# Patient Record
Sex: MALE | Race: WHITE | NOT HISPANIC OR LATINO | Employment: FULL TIME | URBAN - METROPOLITAN AREA
[De-identification: names, ages, dates, MRNs, and addresses within clinical notes are randomized per-mention and may not be internally consistent; named-entity substitution may affect disease eponyms.]

---

## 2017-01-06 ENCOUNTER — RESOLUTE PROFESSIONAL BILLING HOSPITAL PROF FEE (OUTPATIENT)
Dept: HOSPITALIST | Facility: MEDICAL CENTER | Age: 42
End: 2017-01-06
Payer: COMMERCIAL

## 2017-01-06 ENCOUNTER — HOSPITAL ENCOUNTER (OUTPATIENT)
Facility: MEDICAL CENTER | Age: 42
End: 2017-01-07
Attending: EMERGENCY MEDICINE | Admitting: EMERGENCY MEDICINE
Payer: COMMERCIAL

## 2017-01-06 ENCOUNTER — APPOINTMENT (OUTPATIENT)
Dept: RADIOLOGY | Facility: MEDICAL CENTER | Age: 42
End: 2017-01-06
Attending: EMERGENCY MEDICINE
Payer: COMMERCIAL

## 2017-01-06 DIAGNOSIS — R07.9 CHEST PAIN, UNSPECIFIED TYPE: ICD-10-CM

## 2017-01-06 DIAGNOSIS — R55 SYNCOPE, UNSPECIFIED SYNCOPE TYPE: ICD-10-CM

## 2017-01-06 LAB
ALBUMIN SERPL BCP-MCNC: 4.2 G/DL (ref 3.2–4.9)
ALBUMIN/GLOB SERPL: 1.9 G/DL
ALP SERPL-CCNC: 59 U/L (ref 30–99)
ALT SERPL-CCNC: 18 U/L (ref 2–50)
ANION GAP SERPL CALC-SCNC: 10 MMOL/L (ref 0–11.9)
APTT PPP: 24 SEC (ref 24.7–36)
AST SERPL-CCNC: 19 U/L (ref 12–45)
BASOPHILS # BLD AUTO: 0.1 % (ref 0–1.8)
BASOPHILS # BLD: 0.01 K/UL (ref 0–0.12)
BILIRUB SERPL-MCNC: 0.8 MG/DL (ref 0.1–1.5)
BNP SERPL-MCNC: 9 PG/ML (ref 0–100)
BUN SERPL-MCNC: 21 MG/DL (ref 8–22)
CALCIUM SERPL-MCNC: 9.2 MG/DL (ref 8.5–10.5)
CHLORIDE SERPL-SCNC: 106 MMOL/L (ref 96–112)
CO2 SERPL-SCNC: 22 MMOL/L (ref 20–33)
CREAT SERPL-MCNC: 1.02 MG/DL (ref 0.5–1.4)
DEPRECATED D DIMER PPP IA-ACNC: <200 NG/ML(D-DU)
EOSINOPHIL # BLD AUTO: 0.01 K/UL (ref 0–0.51)
EOSINOPHIL NFR BLD: 0.1 % (ref 0–6.9)
ERYTHROCYTE [DISTWIDTH] IN BLOOD BY AUTOMATED COUNT: 40.3 FL (ref 35.9–50)
GFR SERPL CREATININE-BSD FRML MDRD: >60 ML/MIN/1.73 M 2
GLOBULIN SER CALC-MCNC: 2.2 G/DL (ref 1.9–3.5)
GLUCOSE SERPL-MCNC: 105 MG/DL (ref 65–99)
HCT VFR BLD AUTO: 44.6 % (ref 42–52)
HGB BLD-MCNC: 15 G/DL (ref 14–18)
IMM GRANULOCYTES # BLD AUTO: 0.05 K/UL (ref 0–0.11)
IMM GRANULOCYTES NFR BLD AUTO: 0.5 % (ref 0–0.9)
INR PPP: 1 (ref 0.87–1.13)
LYMPHOCYTES # BLD AUTO: 1.04 K/UL (ref 1–4.8)
LYMPHOCYTES NFR BLD: 9.4 % (ref 22–41)
MCH RBC QN AUTO: 30 PG (ref 27–33)
MCHC RBC AUTO-ENTMCNC: 33.6 G/DL (ref 33.7–35.3)
MCV RBC AUTO: 89.2 FL (ref 81.4–97.8)
MONOCYTES # BLD AUTO: 0.42 K/UL (ref 0–0.85)
MONOCYTES NFR BLD AUTO: 3.8 % (ref 0–13.4)
NEUTROPHILS # BLD AUTO: 9.5 K/UL (ref 1.82–7.42)
NEUTROPHILS NFR BLD: 86.1 % (ref 44–72)
NRBC # BLD AUTO: 0 K/UL
NRBC BLD AUTO-RTO: 0 /100 WBC
PLATELET # BLD AUTO: 266 K/UL (ref 164–446)
PMV BLD AUTO: 9.8 FL (ref 9–12.9)
POTASSIUM SERPL-SCNC: 4.1 MMOL/L (ref 3.6–5.5)
PROT SERPL-MCNC: 6.4 G/DL (ref 6–8.2)
PROTHROMBIN TIME: 13.5 SEC (ref 12–14.6)
RBC # BLD AUTO: 5 M/UL (ref 4.7–6.1)
SODIUM SERPL-SCNC: 138 MMOL/L (ref 135–145)
TROPONIN I SERPL-MCNC: <0.01 NG/ML (ref 0–0.04)
TSH SERPL DL<=0.005 MIU/L-ACNC: 4.33 UIU/ML (ref 0.3–3.7)
WBC # BLD AUTO: 11 K/UL (ref 4.8–10.8)

## 2017-01-06 PROCEDURE — 85610 PROTHROMBIN TIME: CPT

## 2017-01-06 PROCEDURE — 85379 FIBRIN DEGRADATION QUANT: CPT

## 2017-01-06 PROCEDURE — 36415 COLL VENOUS BLD VENIPUNCTURE: CPT

## 2017-01-06 PROCEDURE — G0378 HOSPITAL OBSERVATION PER HR: HCPCS

## 2017-01-06 PROCEDURE — 83880 ASSAY OF NATRIURETIC PEPTIDE: CPT

## 2017-01-06 PROCEDURE — 94760 N-INVAS EAR/PLS OXIMETRY 1: CPT

## 2017-01-06 PROCEDURE — 84443 ASSAY THYROID STIM HORMONE: CPT

## 2017-01-06 PROCEDURE — 93005 ELECTROCARDIOGRAM TRACING: CPT | Performed by: EMERGENCY MEDICINE

## 2017-01-06 PROCEDURE — A9270 NON-COVERED ITEM OR SERVICE: HCPCS | Performed by: HOSPITALIST

## 2017-01-06 PROCEDURE — 700102 HCHG RX REV CODE 250 W/ 637 OVERRIDE(OP): Performed by: HOSPITALIST

## 2017-01-06 PROCEDURE — 71010 DX-CHEST-PORTABLE (1 VIEW): CPT

## 2017-01-06 PROCEDURE — 84484 ASSAY OF TROPONIN QUANT: CPT

## 2017-01-06 PROCEDURE — 99220 PR INITIAL OBSERVATION CARE,LEVL III: CPT | Performed by: HOSPITALIST

## 2017-01-06 PROCEDURE — 85025 COMPLETE CBC W/AUTO DIFF WBC: CPT

## 2017-01-06 PROCEDURE — 80053 COMPREHEN METABOLIC PANEL: CPT

## 2017-01-06 PROCEDURE — 85730 THROMBOPLASTIN TIME PARTIAL: CPT

## 2017-01-06 PROCEDURE — 96360 HYDRATION IV INFUSION INIT: CPT

## 2017-01-06 PROCEDURE — 99285 EMERGENCY DEPT VISIT HI MDM: CPT

## 2017-01-06 PROCEDURE — 700105 HCHG RX REV CODE 258: Performed by: EMERGENCY MEDICINE

## 2017-01-06 RX ORDER — NITROGLYCERIN 0.4 MG/1
0.4 TABLET SUBLINGUAL
Status: DISCONTINUED | OUTPATIENT
Start: 2017-01-06 | End: 2017-01-07 | Stop reason: HOSPADM

## 2017-01-06 RX ORDER — PROMETHAZINE HYDROCHLORIDE 25 MG/1
12.5-25 SUPPOSITORY RECTAL EVERY 4 HOURS PRN
Status: DISCONTINUED | OUTPATIENT
Start: 2017-01-06 | End: 2017-01-07 | Stop reason: HOSPADM

## 2017-01-06 RX ORDER — ASPIRIN 325 MG
325 TABLET ORAL DAILY
Status: DISCONTINUED | OUTPATIENT
Start: 2017-01-07 | End: 2017-01-07 | Stop reason: HOSPADM

## 2017-01-06 RX ORDER — PROMETHAZINE HYDROCHLORIDE 25 MG/1
12.5-25 TABLET ORAL EVERY 4 HOURS PRN
Status: DISCONTINUED | OUTPATIENT
Start: 2017-01-06 | End: 2017-01-07 | Stop reason: HOSPADM

## 2017-01-06 RX ORDER — OMEPRAZOLE 20 MG/1
20 CAPSULE, DELAYED RELEASE ORAL DAILY
Status: DISCONTINUED | OUTPATIENT
Start: 2017-01-07 | End: 2017-01-07 | Stop reason: HOSPADM

## 2017-01-06 RX ORDER — ONDANSETRON 4 MG/1
4 TABLET, ORALLY DISINTEGRATING ORAL EVERY 4 HOURS PRN
Status: DISCONTINUED | OUTPATIENT
Start: 2017-01-06 | End: 2017-01-07 | Stop reason: HOSPADM

## 2017-01-06 RX ORDER — ACETAMINOPHEN 325 MG/1
650 TABLET ORAL EVERY 6 HOURS PRN
Status: DISCONTINUED | OUTPATIENT
Start: 2017-01-06 | End: 2017-01-07 | Stop reason: HOSPADM

## 2017-01-06 RX ORDER — OMEPRAZOLE 20 MG/1
20 CAPSULE, DELAYED RELEASE ORAL DAILY
COMMUNITY

## 2017-01-06 RX ORDER — ONDANSETRON 2 MG/ML
4 INJECTION INTRAMUSCULAR; INTRAVENOUS EVERY 4 HOURS PRN
Status: DISCONTINUED | OUTPATIENT
Start: 2017-01-06 | End: 2017-01-07 | Stop reason: HOSPADM

## 2017-01-06 RX ORDER — SODIUM CHLORIDE 9 MG/ML
1000 INJECTION, SOLUTION INTRAVENOUS ONCE
Status: COMPLETED | OUTPATIENT
Start: 2017-01-06 | End: 2017-01-06

## 2017-01-06 RX ORDER — MORPHINE SULFATE 4 MG/ML
1-2 INJECTION, SOLUTION INTRAMUSCULAR; INTRAVENOUS EVERY 4 HOURS PRN
Status: DISCONTINUED | OUTPATIENT
Start: 2017-01-06 | End: 2017-01-07 | Stop reason: HOSPADM

## 2017-01-06 RX ADMIN — SODIUM CHLORIDE 1000 ML: 9 INJECTION, SOLUTION INTRAVENOUS at 13:03

## 2017-01-06 RX ADMIN — ACETAMINOPHEN 650 MG: 325 TABLET, FILM COATED ORAL at 23:34

## 2017-01-06 RX ADMIN — ACETAMINOPHEN 650 MG: 325 TABLET, FILM COATED ORAL at 17:15

## 2017-01-06 ASSESSMENT — PAIN SCALES - GENERAL
PAINLEVEL_OUTOF10: 0
PAINLEVEL_OUTOF10: 0

## 2017-01-06 ASSESSMENT — LIFESTYLE VARIABLES
EVER_SMOKED: NEVER
ALCOHOL_USE: NO

## 2017-01-06 NOTE — ED NOTES
The Medication Reconciliation has been completed per patient  Allergies have been reviewed  Antibiotic use in 30 days - NONE    Pharmacy:  [facility] [place]

## 2017-01-06 NOTE — IP AVS SNAPSHOT
After Visit Summary                                                                                                                  Name:Kulwinder Luciano  Medical Record Number:[id number]  CSN: [id number]    YOB: 1975   Age: 41 y.o.  Sex: male  HT:  WT: 88.2 kg (194 lb 7.1 oz)          Admit Date: 1/6/2017     Discharge Date:   Today's Date: 1/7/2017  Attending Doctor:  Gianni Edward M.D.                  Allergies:  Review of patient's allergies indicates no known allergies.            Discharge Instructions       Nonspecific Chest Pain   Chest pain can be caused by many different conditions. There is always a chance that your pain could be related to something serious, such as a heart attack or a blood clot in your lungs. Chest pain can also be caused by conditions that are not life-threatening. If you have chest pain, it is very important to follow up with your health care provider.  CAUSES   Chest pain can be caused by:  · Heartburn.  · Pneumonia or bronchitis.  · Anxiety or stress.  · Inflammation around your heart (pericarditis) or lung (pleuritis or pleurisy).  · A blood clot in your lung.  · A collapsed lung (pneumothorax). It can develop suddenly on its own (spontaneous pneumothorax) or from trauma to the chest.  · Shingles infection (varicella-zoster virus).  · Heart attack.  · Damage to the bones, muscles, and cartilage that make up your chest wall. This can include:  ¨ Bruised bones due to injury.  ¨ Strained muscles or cartilage due to frequent or repeated coughing or overwork.  ¨ Fracture to one or more ribs.  ¨ Sore cartilage due to inflammation (costochondritis).  RISK FACTORS   Risk factors for chest pain may include:  1. Activities that increase your risk for trauma or injury to your chest.  2. Respiratory infections or conditions that cause frequent coughing.  3. Medical conditions or overeating that can cause heartburn.  4. Heart disease or family history of heart  disease.  5. Conditions or health behaviors that increase your risk of developing a blood clot.  6. Having had chicken pox (varicella zoster).  SIGNS AND SYMPTOMS  Chest pain can feel like:  1. Burning or tingling on the surface of your chest or deep in your chest.  2. Crushing, pressure, aching, or squeezing pain.  3. Dull or sharp pain that is worse when you move, cough, or take a deep breath.  4. Pain that is also felt in your back, neck, shoulder, or arm, or pain that spreads to any of these areas.  Your chest pain may come and go, or it may stay constant.  DIAGNOSIS  Lab tests or other studies may be needed to find the cause of your pain. Your health care provider may have you take a test called an ambulatory ECG (electrocardiogram). An ECG records your heartbeat patterns at the time the test is performed. You may also have other tests, such as:  1. Transthoracic echocardiogram (TTE). During echocardiography, sound waves are used to create a picture of all of the heart structures and to look at how blood flows through your heart.  2. Transesophageal echocardiogram (JOHN). This is a more advanced imaging test that obtains images from inside your body. It allows your health care provider to see your heart in finer detail.  3. Cardiac monitoring. This allows your health care provider to monitor your heart rate and rhythm in real time.  4. Holter monitor. This is a portable device that records your heartbeat and can help to diagnose abnormal heartbeats. It allows your health care provider to track your heart activity for several days, if needed.  5. Stress tests. These can be done through exercise or by taking medicine that makes your heart beat more quickly.  6. Blood tests.  7. Imaging tests.  TREATMENT   Your treatment depends on what is causing your chest pain. Treatment may include:  1. Medicines. These may include:  1. Acid blockers for heartburn.  2. Anti-inflammatory medicine.  3. Pain medicine for  inflammatory conditions.  4. Antibiotic medicine, if an infection is present.  5. Medicines to dissolve blood clots.  6. Medicines to treat coronary artery disease.  2. Supportive care for conditions that do not require medicines. This may include:  1. Resting.  2. Applying heat or cold packs to injured areas.  3. Limiting activities until pain decreases.  HOME CARE INSTRUCTIONS  · If you were prescribed an antibiotic medicine, finish it all even if you start to feel better.  · Avoid any activities that bring on chest pain.  · Do not use any tobacco products, including cigarettes, chewing tobacco, or electronic cigarettes. If you need help quitting, ask your health care provider.  · Do not drink alcohol.  · Take medicines only as directed by your health care provider.  · Keep all follow-up visits as directed by your health care provider. This is important. This includes any further testing if your chest pain does not go away.  · If heartburn is the cause for your chest pain, you may be told to keep your head raised (elevated) while sleeping. This reduces the chance that acid will go from your stomach into your esophagus.  · Make lifestyle changes as directed by your health care provider. These may include:  ¨ Getting regular exercise. Ask your health care provider to suggest some activities that are safe for you.  ¨ Eating a heart-healthy diet. A registered dietitian can help you to learn healthy eating options.  ¨ Maintaining a healthy weight.  ¨ Managing diabetes, if necessary.  ¨ Reducing stress.  SEEK MEDICAL CARE IF:  · Your chest pain does not go away after treatment.  · You have a rash with blisters on your chest.  · You have a fever.  SEEK IMMEDIATE MEDICAL CARE IF:   · Your chest pain is worse.  · You have an increasing cough, or you cough up blood.  · You have severe abdominal pain.  · You have severe weakness.  · You faint.  · You have chills.  · You have sudden, unexplained chest discomfort.  · You have  sudden, unexplained discomfort in your arms, back, neck, or jaw.  · You have shortness of breath at any time.  · You suddenly start to sweat, or your skin gets clammy.  · You feel nauseous or you vomit.  · You suddenly feel light-headed or dizzy.  · Your heart begins to beat quickly, or it feels like it is skipping beats.  These symptoms may represent a serious problem that is an emergency. Do not wait to see if the symptoms will go away. Get medical help right away. Call your local emergency services (911 in the [place] Do not drive yourself to the hospital.     This information is not intended to replace advice given to you by your health care provider. Make sure you discuss any questions you have with your health care provider.     Document Released: 09/27/2006 Document Revised: 01/08/2016 Document Reviewed: 07/24/2015  [employer] Patient Education ©2016 [employer]    Discharge Instructions    Discharged to home by car with relative. Discharged via walking, hospital escort: Yes.  Special equipment needed: Not Applicable    Be sure to schedule a follow-up appointment with your primary care doctor or any specialists as instructed.     Discharge Plan:   Diet Plan: Discussed  Activity Level: Discussed  Confirmed Follow up Appointment: Patient to Call and Schedule Appointment  Confirmed Symptoms Management: Discussed  Medication Reconciliation Updated: Yes  Influenza Vaccine Indication: Indicated: 9 to 64 years of age    I understand that a diet low in cholesterol, fat, and sodium is recommended for good health. Unless I have been given specific instructions below for another diet, I accept this instruction as my diet prescription.   Other diet: Regular    Special Instructions: None    · Is patient discharged on Warfarin / Coumadin?   No     · Is patient Post Blood Transfusion?  No    Depression / Suicide Risk    As you are discharged from this Critical access hospital facility, it is important to learn how to keep  safe from harming yourself.    Recognize the warning signs:  · Abrupt changes in personality, positive or negative- including increase in energy   · Giving away possessions  · Change in eating patterns- significant weight changes-  positive or negative  · Change in sleeping patterns- unable to sleep or sleeping all the time   · Unwillingness or inability to communicate  · Depression  · Unusual sadness, discouragement and loneliness  · Talk of wanting to die  · Neglect of personal appearance   · Rebelliousness- reckless behavior  · Withdrawal from people/activities they love  · Confusion- inability to concentrate     If you or a loved one observes any of these behaviors or has concerns about self-harm, here's what you can do:  · Talk about it- your feelings and reasons for harming yourself  · Remove any means that you might use to hurt yourself (examples: pills, rope, extension cords, firearm)  · Get professional help from the community (Mental Health, Substance Abuse, psychological counseling)  · Do not be alone:Call your Safe Contact- someone whom you trust who will be there for you.  · Call your local CRISIS HOTLINE 936-5137 or 879-370-8110  · Call your local Children's Mobile Crisis Response Team [place] (290) 090-1070 or www.[employer]  · Call the toll free National Suicide Prevention Hotlines   · National Suicide Prevention Lifeline 448-907-PKTY (5157)  · National Hope Line Network 800-SUICIDE (935-4428)             Discharge Medication Instructions:    Below are the medications your physician expects you to take upon discharge:    Review all your home medications and newly ordered medications with your doctor and/or pharmacist. Follow medication instructions as directed by your doctor and/or pharmacist.    Please keep your medication list with you and share with your physician.               Medication List      CONTINUE taking these medications        Instructions    multivitamin Tabs    Take 1  Tab by mouth every day.   Dose:  1 Tab       omeprazole 20 MG delayed-release capsule   Commonly known as:  PRILOSEC    Take 20 mg by mouth every day.   Dose:  20 mg               Instructions           Diet / Nutrition:    Follow any diet instructions given to you by your doctor or the dietician, including how much salt (sodium) you are allowed each day.    If you are overweight, talk to your doctor about a weight reduction plan.    Activity:    Remain physically active following your doctor's instructions about exercise and activity.    Rest often.     Any time you become even a little tired or short of breath, SIT DOWN and rest.    Worsening Symptoms:    Report any of the following signs and symptoms to the doctor's office immediately:    *Pain of jaw, arm, or neck  *Chest pain not relieved by medication                               *Dizziness or loss of consciousness  *Difficulty breathing even when at rest   *More tired than usual                                       *Bleeding drainage or swelling of surgical site  *Swelling of feet, ankles, legs or stomach                 *Fever (>100ºF)  *Pink or blood tinged sputum  *Weight gain (3lbs/day or 5lbs /week)           *Shock from internal defibrillator (if applicable)  *Palpitations or irregular heartbeats                *Cool and/or numb extremities    Stroke Awareness    Common Risk Factors for Stroke include:    Age  Atrial Fibrillation  Carotid Artery Stenosis  Diabetes Mellitus  Excessive alcohol consumption  High blood pressure  Overweight   Physical inactivity  Smoking    Warning signs and symptoms of a stroke include:    *Sudden numbness or weakness of the face, arm or leg (especially on one side of the body).  *Sudden confusion, trouble speaking or understanding.  *Sudden trouble seeing in one or both eyes.  *Sudden trouble walking, dizziness, loss of balance or coordination.Sudden severe headache with no known cause.    It is very important to get  treatment quickly when a stroke occurs. If you experience any of the above warning signs, call 911 immediately.                   Disclaimer         Quit Smoking / Tobacco Use:    I understand the use of any tobacco products increases my chance of suffering from future heart disease or stroke and could cause other illnesses which may shorten my life. Quitting the use of tobacco products is the single most important thing I can do to improve my health. For further information on smoking / tobacco cessation call a Toll Free Quit Line at 1-205.301.6094 (*National Cancer [place]) or 1-332.977.3231 (American Lung Association) or you can access the web based program at www.lungusa.org.    [place] Tobacco Users Help Line:  (237) 212-6046       Toll Free: 1-927.470.6233  Quit Tobacco Program [facility] (321)456-7316    Crisis Hotline:    [place] Crisis Hotline:  4-421-XQDPJJW or 1-328.352.1189    [place] Crisis Hotline:    1-549.959.1544 or 375-091-2573    Discharge Survey:   Thank you for choosing [facility]. We hope we did everything we could to make your hospital stay a pleasant one. You may be receiving a phone survey and we would appreciate your time and participation in answering the questions. Your input is very valuable to us in our efforts to improve our service to our patients and their families.        My signature on this form indicates that:    1. I have reviewed and understand the above information.  2. My questions regarding this information have been answered to my satisfaction.  3. I have formulated a plan with my discharge nurse to obtain my prescribed medications for home.                  Disclaimer         __________________________________                     __________       ________                       Patient Signature                                                 Date                    Time

## 2017-01-06 NOTE — ED NOTES
"Pt bib EMS from [facility] area with c/c syncope & hypotension.  Pt was taking a break from snowboarding, drinking a beer, when he suddenly felt \"weird\" & syncoped.  Per bystanders on scene, pt was \"out\" x1 minute.  Pt states \"i woke back up with crushing chest pain.\"  Per EMS, pt was 60/p upon their arrival.  Pt arrives a&ox4, /64, ekg in process, denies chest pain at this time.  Pt received 500ml normal saline prior to arrival.  Pt reports mitral value repair at age 19, has yearly cardiology appts with Dr. Guillory.  On cardiac monitor.  Chart up for ERP evaluation.    **Pt requested to go to [facility].  Pt was diverted due to critical care divert.   "

## 2017-01-06 NOTE — ED PROVIDER NOTES
"ED Provider Note    CHIEF COMPLAINT  Chief Complaint   Patient presents with   • Syncope   • Blood Pressure Problem     60/p on scene       HPI  Kulwinder Luciano is a 41 y.o. 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 The patient was snowboarding, and went to the [place]. He says he was there for about 20 minutes, and was drinking a beer. He said he started feeling short of breath, then everything became dark and he developed ringing in his ears. He then passed out and was helped to the ground by bystanders. He was noted to be unresponsive for about 1 minute. When he awoke he was having \"crushing chest pain\" which lasted for about 15 minutes. He also felt short of breath and was diaphoretic, but denied any nausea or vomiting. The patient said he had an IV placed and his pain slowly resolved. He is given 4 baby aspirin. He was transported to his facility for further care.  He denies any current chest pain or shortness of breath. He denies any headache. He says he has had a cold for the last week with some congestion. He has had no vomiting or diarrhea. The patient says he had a mitral valve repair done at age 19 and is followed by Dr. Guillory of cardiology.    REVIEW OF SYSTEMS  See HPI for further details. All other systems are negative.     PAST MEDICAL HISTORY  Past Medical History   Diagnosis Date   • GERD (gastroesophageal reflux disease) 4/11/2010   • Personal history of mitral valve repair 4/11/2010       FAMILY HISTORY  Family History   Problem Relation Age of Onset   • Heart Disease Father      Mitral Regurg (surgery)   • Heart Disease Paternal Grandmother      Migtral Regurg (surgery)   • Cancer Maternal Grandmother      Breast Ca   • Cancer Other      MGA Breast Ca       SOCIAL HISTORY  Social History     Social History   • Marital Status:      Spouse Name: N/A   • Number of Children: 2   • " Years of Education: N/A     Occupational History   •       [employer]     Social History Main Topics   • Smoking status: Never Smoker    • Smokeless tobacco: None   • Alcohol Use: Yes      Comment: Mod   • Drug Use: None   • Sexual Activity:     Partners: Female     Other Topics Concern   • None     Social History Narrative       SURGICAL HISTORY  Past Surgical History   Procedure Laterality Date   • Mitral valve repair         CURRENT MEDICATIONS  Home Medications     Reviewed by Sarah Amos (Pharmacy Tech) on 01/06/17 at 1242  Med List Status: Complete    Medication Last Dose Status    multivitamin (THERAGRAN) Tab 1/6/2017 Active    omeprazole (PRILOSEC) 20 MG delayed-release capsule 1/6/2017 Active                ALLERGIES  No Known Allergies    PHYSICAL EXAM  VITAL SIGNS: /64 mmHg  Pulse 95  Temp(Src) 36.9 °C (98.4 °F)  Resp 18  Wt 88.451 kg (195 lb)  SpO2 97%  Constitutional: Awake, alert, in no acute distress, Non-toxic appearance.   HENT: Atraumatic. Bilateral external ears normal, mucous membranes moist, throat nonerythematous without exudates, nose is normal.  Eyes: PERRL, EOMI, conjunctiva moist, noninjected.  Neck: Nontender, Normal range of motion, No nuchal rigidity, No stridor.   Lymphatic: No lymphadenopathy noted.   Cardiovascular: Regular rate and rhythm, no murmurs, rubs, gallops.  Thorax & Lungs:  Good breath sounds bilaterally, no wheezes, rales, or retractions.  No chest tenderness.  Abdomen: Bowel sounds normal, Soft, nontender, nondistended, no rebound, guarding, masses.  Back: No CVA or spinal tenderness.  Extremities: Intact distal pulses, No edema, No tenderness.   Skin: Warm, Dry, No rashes.   Musculoskeletal: No joint swelling or tenderness.  Neurologic: Alert & oriented x 3, cranial nerves II through XII intact, speech is fluent, no facial asymmetry, sensory and motor function normal. No pronator drift, alternating finger movements are intact.  No focal deficits.   Psychiatric: Affect normal, Judgment normal, Mood normal.     EKG  Twelve-lead EKG shows normal sinus rhythm, rate of 88, 1st degree AV block, normal QTc interval, normal axis, no acute ST wave elevations or ST depressions, no pathologic Q waves, no evidence of acute injury or ischemic pattern on my reading, there is no previous EKG for comparison.        RADIOLOGY/PROCEDURES  DX-CHEST-PORTABLE (1 VIEW)   Final Result      1.  No acute cardiopulmonary disease.   2.  Prior open heart surgery.            COURSE & MEDICAL DECISION MAKING  Pertinent Labs & Imaging studies reviewed. (See chart for details)  The patient presents after having a witnessed syncopal episode. He also had chest pain which has since resolved. He has already received aspirin. EKG shows a normal sinus rhythm. Chest x-ray shows no acute cardiac or pulmonary abnormalities. The patient is low-risk for a pulmonary embolism, and d-dimer is negative. CBC shows a white count of 11,000, 86% polys, hemoglobin 15.0, d-dimer less than 200, chemistry shows a glucose of 105, otherwise normal, troponin less than 0.01, BNP 9, TSH 4.33.  Dr. Conklin of cardiology was consulted and recommended a nuclear stress test and echocardiogram. I discussed the case with Dr. Pratt for admission.    FINAL IMPRESSION  1. Syncope  2. Chest pain  3.         Electronically signed by: Aubrey Waterman, 1/6/2017 1:01 PM

## 2017-01-07 ENCOUNTER — APPOINTMENT (OUTPATIENT)
Dept: RADIOLOGY | Facility: MEDICAL CENTER | Age: 42
End: 2017-01-07
Attending: HOSPITALIST
Payer: COMMERCIAL

## 2017-01-07 VITALS
OXYGEN SATURATION: 99 % | BODY MASS INDEX: 24.3 KG/M2 | RESPIRATION RATE: 10 BRPM | DIASTOLIC BLOOD PRESSURE: 65 MMHG | WEIGHT: 194.45 LBS | HEART RATE: 70 BPM | TEMPERATURE: 98.1 F | SYSTOLIC BLOOD PRESSURE: 111 MMHG

## 2017-01-07 PROBLEM — R07.9 CHEST PAIN: Status: RESOLVED | Noted: 2017-01-06 | Resolved: 2017-01-07

## 2017-01-07 LAB
CHOLEST SERPL-MCNC: 115 MG/DL (ref 100–199)
HDLC SERPL-MCNC: 46 MG/DL
LDLC SERPL CALC-MCNC: 56 MG/DL
LV EJECT FRACT  99904: 55
LV EJECT FRACT MOD 2C 99903: 49.71
LV EJECT FRACT MOD 4C 99902: 58.2
LV EJECT FRACT MOD BP 99901: 52.14
TRIGL SERPL-MCNC: 67 MG/DL (ref 0–149)

## 2017-01-07 PROCEDURE — 93306 TTE W/DOPPLER COMPLETE: CPT

## 2017-01-07 PROCEDURE — 90471 IMMUNIZATION ADMIN: CPT

## 2017-01-07 PROCEDURE — G0378 HOSPITAL OBSERVATION PER HR: HCPCS

## 2017-01-07 PROCEDURE — 99217 PR OBSERVATION CARE DISCHARGE: CPT | Performed by: HOSPITALIST

## 2017-01-07 PROCEDURE — 90686 IIV4 VACC NO PRSV 0.5 ML IM: CPT | Performed by: HOSPITALIST

## 2017-01-07 PROCEDURE — 93306 TTE W/DOPPLER COMPLETE: CPT | Mod: 26 | Performed by: INTERNAL MEDICINE

## 2017-01-07 PROCEDURE — 700111 HCHG RX REV CODE 636 W/ 250 OVERRIDE (IP): Performed by: HOSPITALIST

## 2017-01-07 PROCEDURE — 36415 COLL VENOUS BLD VENIPUNCTURE: CPT

## 2017-01-07 PROCEDURE — A9502 TC99M TETROFOSMIN: HCPCS

## 2017-01-07 PROCEDURE — 80061 LIPID PANEL: CPT

## 2017-01-07 RX ADMIN — INFLUENZA A VIRUS A/CALIFORNIA/7/2009 X-179A (H1N1) ANTIGEN (FORMALDEHYDE INACTIVATED), INFLUENZA A VIRUS A/HONG KONG/4801/2014 X-263B (H3N2) ANTIGEN (FORMALDEHYDE INACTIVATED), INFLUENZA B VIRUS B/PHUKET/3073/2013 ANTIGEN (FORMALDEHYDE INACTIVATED), AND INFLUENZA B VIRUS B/BRISBANE/60/2008 ANTIGEN (FORMALDEHYDE INACTIVATED) 0.5 ML: 15; 15; 15; 15 INJECTION, SUSPENSION INTRAMUSCULAR at 12:09

## 2017-01-07 ASSESSMENT — PAIN SCALES - GENERAL
PAINLEVEL_OUTOF10: 0

## 2017-01-07 NOTE — DISCHARGE PLANNING
Care Transition Team Assessment    Information Source  Orientation : Oriented x 4  Who is responsible for making decisions for patient? : Patient  Source of Information: Patient, Spouse  Primary Caregiver for Others?: No  Why do you believe you were admitted?: allergic reaction, passed out         Elopement Risk  Legal Hold: No  Ambulatory or Self Mobile in Wheelchair: Yes    Interdisciplinary Discharge Planning  Does Admitting Nurse Feel This Could be a Complex Discharge?: No  Primary Care Physician: Lydia HOGAN  Lives with - Patient's Self Care Capacity: Spouse  Support Systems: Family Member(s), Friends / Neighbors  Patient Expects to be Discharged to:: home  Assistance Needed: No  Durable Medical Equipment: Not Applicable    Discharge Preparedness  [facility] resources needed?: None  Pharmacy: [facility] [place]    Functional Assesment  Prior Functional Level: Ambulatory, Drives Self, Independent with Activities of Daily Living    Finances  Medical Insurance Coverage: Private insurance              Advance Directive  Advance Directive?: None  Advance Directive offered?: AD Booklet given         Psychological Assessment  Suspect Abuse: No         Anticipated Discharge Information  Anticipated discharge disposition: Home  Discharge Address: see facesheet  Discharge Contact Phone Number: see facesheet

## 2017-01-07 NOTE — H&P
PRIMARY CARE PHYSICIAN:  Previously a nurse practitioner Farzaneh [place].    CARDIOLOGIST:  Axel Guillory MD    CHIEF COMPLAINT:  Passed out with chest pressure.    HISTORY OF PRESENT ILLNESS:  The patient is a 41-year-old male with the   history of mitral valve prolapse plus mitral valve repair at age 19 brought to   [facility] after he passed out with symptoms of chest pressure.  Patient had gone   skiing earlier in the morning, had took a few runs, was at the bar and   consumed about half glass of beer when he developed symptoms of sneezing,   congestion, and wheezing.  He is diaphoretic and then reportedly passed out.    He had sensation of ringing in his ears with vision fading white.  Patient   recalls awakening with chest pressure lasting for approximately 20 minutes.    He reports symptoms are now resolved.  No calf pain or leg edema.  No   shortness breath currently.  He reports that he has had similar although less   intense reaction to certain beers in the past.  He reports no particular   change with exertion or movement at that time.  No paresthesias or localized   weakness.  No rash or joint swelling.  Normally fairly active and was planning   to go to work following his ski activities.    REVIEW OF SYSTEMS:  As above, otherwise negative according to AMA and CMS   criteria.    PAST MEDICAL HISTORY:  Includes mitral valve prolapse post repair at age 19.    Follow up with Dr. Guillory with routine echocardiograms, last reportedly   greater than a year ago.  GERD.    HOME MEDICATIONS:  Include multivitamin daily and Prilosec 20 daily.    ALLERGIES:  No known drug allergies.    SOCIAL HISTORY:  Occasional beer.  No tobacco or illicit drugs.  He works for   [employer].    FAMILY HISTORY:  Father had mitral valve repair and also atrial fibrillation.    PHYSICAL EXAMINATION:  VITAL SIGNS:  Temperature of 36.9, pulse 80s-70s, respirations 18, blood   pressure 106/64, 94% on room air, and 58 kilograms.  GENERAL:  The  patient was alert, appropriate, and oriented.  No apparent   distress.  HEENT:  Anicteric.  Extraocular movements are intact.  Mucous members are dry.  NECK:  No cervical or supraclavicular adenopathy.  Trachea is midline.  CARDIOVASCULAR:  Regular rate and rhythm.  No murmurs.  A well-healed   sternotomy scar.  LUNGS:  Clear to auscultation bilaterally.  Normal chest wall excursion effort   without chest wall tenderness.  ABDOMEN:  Bowel sounds are present, soft, nontender, and nondistended.  No   splenomegaly.  BACK:  No CVA or paraspinal tenderness.  EXTREMITIES:  There is no lower extremity edema, negative Homans sign,   symmetric, generalized 5/5 strength, 2+ symmetric radial pulses.  NEUROLOGICAL:  Cranial nerves grossly intact.  No focal extremity weakness.  SKIN:  Warm and dry, without pallor.  PSYCHIATRIC:  Calm and cooperative without depressed affect.    LABORATORY STUDIES:  Patient's labs reveal white count 11, hemoglobin 15, and   platelet count 266,000.  BUN and creatinine 21 and 1.02.  Blood sugar 105.    Troponin is less than 0.01.  ____ of 9.  D-dimer is less than 200.  INR 1.    TSH 4.3.    RADIOGRAPHIC STUDIES:  Chest x-ray, no acute cardiopulmonary process, open   heart changes.    EKG, my interpretation revealed sinus rhythm, rate approximately 88,   first-degree AV block, CT interval 260.  Intraventricular conduction delay.    IMPRESSION AND PLAN:  1.  Syncope with chest pain.  Patient will be admitted on observation status   to the telemetry floor.  Possible vasovagal event.  Symptoms have currently   resolved.  We will start on aspirin therapy, repeat serial cardiac enzymes to   evaluate for acute coronary syndrome.  Provide p.r.n. nitroglycerin and   morphine, continuous 2 L nasal cannula.  We will provide IV fluids, check   orthostatics.  Dr. John Conklin on-call cardiologist for Dr. Guillory was   contacted by ER and recommend echocardiogram to evaluate LV and health   function, nuclear  stress test to evaluate for ischemic heart disease.  We will   monitor on telemetry for underlying arrhythmia.  Patient may benefit from   outpatient event monitoring and will be referred to cardiology following   discharge.  2.  Leukocytosis.  The patient is afebrile without clear symptoms of   infection, suspect stress reaction.  Follow clinically.  3.  Elevated TSH, may suggest underlying hypothyroidism or subclinical state.    We will check free T4.  4.  History of mitral valve prolapse postrepair without symptoms of   decompensation or heart failure.  5.  First-degree atrioventricular block.  Plan and workup as above with   continuous telemetry monitoring.       ____________________________________     MD EDDIE MCGILL / NTS    DD:  01/06/2017 19:54:22  DT:  01/06/2017 20:37:36    D#:  [id number]  Job#:  [id number]

## 2017-01-07 NOTE — PROGRESS NOTES
Pt given and understands discharge instructions; PIV removed, covered with 4x4 gauze, wrapped with coban; ID band removed.  Pt to lobby with wife, discharged home.

## 2017-01-07 NOTE — PROGRESS NOTES
Assumed care of Pt at 0700 after bedside report from Margarita RN, assessment complete.  Pt resting comfortably, A & O X 4, VSS, nsr; Pt denies chest pain, pressure, discomfort.  Updated on and understands POC - to remain NPO for cardiac stress test.  Pt denies needs at this time; bed in low position, call light within reach - will continue to monitor.    0730  Pt to [facility] med for treadmill stress test.    0904  Pt back to room; A & O, VSS, denies chest pain.  Provided with breakfast tray.  US at bedside for Echo.

## 2017-01-07 NOTE — DISCHARGE INSTRUCTIONS
Nonspecific Chest Pain   Chest pain can be caused by many different conditions. There is always a chance that your pain could be related to something serious, such as a heart attack or a blood clot in your lungs. Chest pain can also be caused by conditions that are not life-threatening. If you have chest pain, it is very important to follow up with your health care provider.  CAUSES   Chest pain can be caused by:  · Heartburn.  · Pneumonia or bronchitis.  · Anxiety or stress.  · Inflammation around your heart (pericarditis) or lung (pleuritis or pleurisy).  · A blood clot in your lung.  · A collapsed lung (pneumothorax). It can develop suddenly on its own (spontaneous pneumothorax) or from trauma to the chest.  · Shingles infection (varicella-zoster virus).  · Heart attack.  · Damage to the bones, muscles, and cartilage that make up your chest wall. This can include:  ¨ Bruised bones due to injury.  ¨ Strained muscles or cartilage due to frequent or repeated coughing or overwork.  ¨ Fracture to one or more ribs.  ¨ Sore cartilage due to inflammation (costochondritis).  RISK FACTORS   Risk factors for chest pain may include:  1. Activities that increase your risk for trauma or injury to your chest.  2. Respiratory infections or conditions that cause frequent coughing.  3. Medical conditions or overeating that can cause heartburn.  4. Heart disease or family history of heart disease.  5. Conditions or health behaviors that increase your risk of developing a blood clot.  6. Having had chicken pox (varicella zoster).  SIGNS AND SYMPTOMS  Chest pain can feel like:  1. Burning or tingling on the surface of your chest or deep in your chest.  2. Crushing, pressure, aching, or squeezing pain.  3. Dull or sharp pain that is worse when you move, cough, or take a deep breath.  4. Pain that is also felt in your back, neck, shoulder, or arm, or pain that spreads to any of these areas.  Your chest pain may come and go, or it may  stay constant.  DIAGNOSIS  Lab tests or other studies may be needed to find the cause of your pain. Your health care provider may have you take a test called an ambulatory ECG (electrocardiogram). An ECG records your heartbeat patterns at the time the test is performed. You may also have other tests, such as:  1. Transthoracic echocardiogram (TTE). During echocardiography, sound waves are used to create a picture of all of the heart structures and to look at how blood flows through your heart.  2. Transesophageal echocardiogram (JOHN). This is a more advanced imaging test that obtains images from inside your body. It allows your health care provider to see your heart in finer detail.  3. Cardiac monitoring. This allows your health care provider to monitor your heart rate and rhythm in real time.  4. Holter monitor. This is a portable device that records your heartbeat and can help to diagnose abnormal heartbeats. It allows your health care provider to track your heart activity for several days, if needed.  5. Stress tests. These can be done through exercise or by taking medicine that makes your heart beat more quickly.  6. Blood tests.  7. Imaging tests.  TREATMENT   Your treatment depends on what is causing your chest pain. Treatment may include:  1. Medicines. These may include:  1. Acid blockers for heartburn.  2. Anti-inflammatory medicine.  3. Pain medicine for inflammatory conditions.  4. Antibiotic medicine, if an infection is present.  5. Medicines to dissolve blood clots.  6. Medicines to treat coronary artery disease.  2. Supportive care for conditions that do not require medicines. This may include:  1. Resting.  2. Applying heat or cold packs to injured areas.  3. Limiting activities until pain decreases.  HOME CARE INSTRUCTIONS  · If you were prescribed an antibiotic medicine, finish it all even if you start to feel better.  · Avoid any activities that bring on chest pain.  · Do not use any tobacco  products, including cigarettes, chewing tobacco, or electronic cigarettes. If you need help quitting, ask your health care provider.  · Do not drink alcohol.  · Take medicines only as directed by your health care provider.  · Keep all follow-up visits as directed by your health care provider. This is important. This includes any further testing if your chest pain does not go away.  · If heartburn is the cause for your chest pain, you may be told to keep your head raised (elevated) while sleeping. This reduces the chance that acid will go from your stomach into your esophagus.  · Make lifestyle changes as directed by your health care provider. These may include:  ¨ Getting regular exercise. Ask your health care provider to suggest some activities that are safe for you.  ¨ Eating a heart-healthy diet. A registered dietitian can help you to learn healthy eating options.  ¨ Maintaining a healthy weight.  ¨ Managing diabetes, if necessary.  ¨ Reducing stress.  SEEK MEDICAL CARE IF:  · Your chest pain does not go away after treatment.  · You have a rash with blisters on your chest.  · You have a fever.  SEEK IMMEDIATE MEDICAL CARE IF:   · Your chest pain is worse.  · You have an increasing cough, or you cough up blood.  · You have severe abdominal pain.  · You have severe weakness.  · You faint.  · You have chills.  · You have sudden, unexplained chest discomfort.  · You have sudden, unexplained discomfort in your arms, back, neck, or jaw.  · You have shortness of breath at any time.  · You suddenly start to sweat, or your skin gets clammy.  · You feel nauseous or you vomit.  · You suddenly feel light-headed or dizzy.  · Your heart begins to beat quickly, or it feels like it is skipping beats.  These symptoms may represent a serious problem that is an emergency. Do not wait to see if the symptoms will go away. Get medical help right away. Call your local emergency services (911 in the [place] Do not drive yourself to the  [facility].     This information is not intended to replace advice given to you by your health care provider. Make sure you discuss any questions you have with your health care provider.     Document Released: 09/27/2006 Document Revised: 01/08/2016 Document Reviewed: 07/24/2015  [employer] Interactive Patient Education ©2016 [employer]    Discharge Instructions    Discharged to home by car with relative. Discharged via walking, hospital escort: Yes.  Special equipment needed: Not Applicable    Be sure to schedule a follow-up appointment with your primary care doctor or any specialists as instructed.     Discharge Plan:   Diet Plan: Discussed  Activity Level: Discussed  Confirmed Follow up Appointment: Patient to Call and Schedule Appointment  Confirmed Symptoms Management: Discussed  Medication Reconciliation Updated: Yes  Influenza Vaccine Indication: Indicated: 9 to 64 years of age    I understand that a diet low in cholesterol, fat, and sodium is recommended for good health. Unless I have been given specific instructions below for another diet, I accept this instruction as my diet prescription.   Other diet: Regular    Special Instructions: None    · Is patient discharged on Warfarin / Coumadin?   No     · Is patient Post Blood Transfusion?  No    Depression / Suicide Risk    As [facility] are [facility], it is important to learn how to keep safe from harming yourself.    Recognize the warning signs:  · Abrupt changes in personality, positive or negative- including increase in energy   · Giving away possessions  · Change in eating patterns- significant weight changes-  positive or negative  · Change in sleeping patterns- unable to sleep or sleeping all the time   · Unwillingness or inability to communicate  · Depression  · Unusual sadness, discouragement and loneliness  · Talk of wanting to die  · Neglect of personal appearance   · Rebelliousness- reckless behavior  · Withdrawal from  people/activities they love  · Confusion- inability to concentrate     If you or a loved one observes any of these behaviors or has concerns about self-harm, here's what you can do:  · Talk about it- your feelings and reasons for harming yourself  · Remove any means that you might use to hurt yourself (examples: pills, rope, extension cords, firearm)  · Get professional help from the community (Mental Health, Substance Abuse, psychological counseling)  · Do not be alone:Call your Safe Contact- someone whom you trust who will be there for you.  · Call your local CRISIS HOTLINE 828-2948 or 171-239-7634  · Call your local Children's Mobile Crisis Response Team [place] (664) 177-8851 or www.[employer]  · Call the toll free National Suicide Prevention Hotlines   · National Suicide Prevention Lifeline 148-612-VTBC (2280)  · National Hope Line Network 800-SUICIDE (193-1312)

## 2017-01-07 NOTE — PROGRESS NOTES
Report received,assumed care,assessment completed,poc discussed,verbalized understanding,will continue to monitor.

## 2017-01-07 NOTE — PROGRESS NOTES
Assumed care of Pt at 1630 when Pt transferred to T211 via rney, assessment complete.  Pt resting comfortably with wife at bedside; A & O X 4, VSS, snr; Pt denies pain, pressure, heaviness, SOB, discomfort at this time; only complaint is HA - administered 650 mg Tylenol PO per MAR.  Pt updated on and understands POC; oriented to call light and unit routine; denies other needs at this time.  Bed in low position, call light within reach - will continue to monitor.

## 2017-01-08 NOTE — DISCHARGE SUMMARY
ADMITTING DIAGNOSES:  Syncope with chest pain, leukocytosis, elevated TSH,   prior history of mitral valve prolapse, and first-degree atrioventricular   block.    DISCHARGE DIAGNOSES:  Vasovagal syncope after paroxysms of sneeze, prior mitral valve repair,   borderline high TSH for outpatient followup.    HISTORY OF PRESENT ILLNESS AND HOSPITAL COURSE:  This is a 41-year-old   gentleman, who had a sneezing fit and then had a syncopal episode.  He came in and   echocardiogram and nuclear medicine stress test were performed, both were   essentially benign with no obvious cause of his syncope including no   significant valvular abnormalities, grade I diastolic dysfunction, ejection   fraction of 55%, and a benign stress test.  Therefore, he felt well.  He had   complete resolution of his admitting complaint.  He had no chest pain.  He had   no further lightheadedness and no obvious significant syncope, therefore, he   was able to discharge to home in good and stable condition.    DISCHARGE MEDICATIONS:  He is to resume his home medications at their prior   dosages including his Prilosec 20 mg daily and his multivitamin.    FOLLOWUP:  Primary care preferably within 1-2 weeks of hospital discharge or   to establish with primary care if needed as well as to follow up with his   cardiologist, Dr. Guillory.    Total time of discharge is 37 minutes.       ____________________________________     MD TENZIN Bañuelos / NTS    DD:  01/07/2017 12:47:41  DT:  01/08/2017 00:29:04    D#:  [id number]  Job#:  [id number]

## 2017-02-18 LAB — EKG IMPRESSION: NORMAL

## 2018-01-09 ENCOUNTER — OFFICE VISIT (OUTPATIENT)
Dept: DERMATOLOGY | Facility: IMAGING CENTER | Age: 43
End: 2018-01-09
Payer: COMMERCIAL

## 2018-01-09 DIAGNOSIS — L91.8 INFLAMED SKIN TAG: ICD-10-CM

## 2018-01-09 DIAGNOSIS — L30.9 DERMATITIS: ICD-10-CM

## 2018-01-09 PROBLEM — L98.9 SKIN LESION: Status: ACTIVE | Noted: 2018-01-09

## 2018-01-09 PROCEDURE — 11200 RMVL SKIN TAGS UP TO&INC 15: CPT | Performed by: NURSE PRACTITIONER

## 2018-01-09 PROCEDURE — 99201 PR OFFICE/OUTPT VISIT,NEW,LEVL I: CPT | Mod: 25 | Performed by: NURSE PRACTITIONER

## 2018-01-09 NOTE — ASSESSMENT & PLAN NOTE
He has some itchy skin near his upper eyelids and on his legs. He has used OTC cortisone on his face that helps but he is hesitant to continue this. He uses bar soap to wash his face and to bathe with.

## 2018-01-09 NOTE — PROGRESS NOTES
Chief Complaint   Patient presents with   • Skin Lesion   • Rash         HISTORY OF THE PRESENT ILLNESS: Patient is a 42 y.o. male. This pleasant patient is here today regarding a skin lesion in his axilla and some itchy skin near his upper eyelids and on his legs. He notes his daughter has eczema. He is not aware of any family history of skin cancer and he has no personal history of skin cancer.      Skin lesion  Patient has had a skin lesion on his right axilla for some time. He has clipped it while applying deoderant and it has been painful after that. He used some OTC wart freezing agent that removed part of it..      Dermatitis  He has some itchy skin near his upper eyelids and on his legs. He has used OTC cortisone on his face that helps but he is hesitant to continue this. He uses bar soap to wash his face and to bathe with.       Allergies: Patient has no known allergies.    Current Outpatient Prescriptions   Medication Sig Dispense Refill   • omeprazole (PRILOSEC) 20 MG delayed-release capsule Take 20 mg by mouth every day.     • multivitamin (THERAGRAN) Tab Take 1 Tab by mouth every day.       No current facility-administered medications for this visit.        Past Medical History:   Diagnosis Date   • Dermatitis 1/9/2018   • GERD (gastroesophageal reflux disease) 4/11/2010   • Personal history of mitral valve repair 4/11/2010   • Skin lesion 1/9/2018       Past Surgical History:   Procedure Laterality Date   • MITRAL VALVE REPAIR         Social History   Substance Use Topics   • Smoking status: Never Smoker   • Smokeless tobacco: Not on file   • Alcohol use Yes      Comment: Mod       No family status information on file.     Family History   Problem Relation Age of Onset   • Heart Disease Father      Mitral Regurg (surgery)   • Heart Disease Paternal Grandmother      Migtral Regurg (surgery)   • Cancer Maternal Grandmother      Breast Ca   • Cancer Other      MGA Breast Ca       Review of Systems    Constitutional: Negative for fever, chills, weight loss and malaise/fatigue.   Skin: Skin lesion in his right axilla, itchy skin above his right eyelids and on his lower extremities  Exam: There were no vitals taken for this visit.  General: Normal appearing. No distress.    Skin: Upper eyelids slightly erythematous scaly areas medially just above the eyelid  Right axilla slightly raised skin colored lesion with mild erythema about 2 mm in diameter picture taken and will be scanned into his file. After discussion patient understands liquid nitrogen can cause hypo-or hyper pigmentation persists and persistent erythema and/or blistering. This is treated with liquid nitrogen      Please note that this dictation was created using voice recognition software. I have made every reasonable attempt to correct obvious errors, but I expect that there are errors of grammar and possibly content that I did not discover before finalizing the note.      Assessment/Plan  1. Dermatitis      Advise mild soaps to bathe and wash his face such as Cetaphil and Dove. In the use of emollients immediately after bathing. Patient is given a written sheet with a list of emollients. If symptoms are persistent is advised to return for further consultation.    2. Inflamed skin tag      Treated with liquid nitrogen patient will return if symptomsAnd/or lesion are persistent

## 2018-01-09 NOTE — ASSESSMENT & PLAN NOTE
Patient has had a skin lesion on his right axilla for some time. He has clipped it while applying deoderant and it has been painful after that. He used some OTC wart freezing agent that removed part of it..

## 2021-08-23 ENCOUNTER — HOSPITAL ENCOUNTER (OUTPATIENT)
Facility: MEDICAL CENTER | Age: 46
End: 2021-08-23
Attending: PHYSICIAN ASSISTANT
Payer: COMMERCIAL

## 2021-08-23 PROCEDURE — U0005 INFEC AGEN DETEC AMPLI PROBE: HCPCS

## 2021-08-23 PROCEDURE — U0003 INFECTIOUS AGENT DETECTION BY NUCLEIC ACID (DNA OR RNA); SEVERE ACUTE RESPIRATORY SYNDROME CORONAVIRUS 2 (SARS-COV-2) (CORONAVIRUS DISEASE [COVID-19]), AMPLIFIED PROBE TECHNIQUE, MAKING USE OF HIGH THROUGHPUT TECHNOLOGIES AS DESCRIBED BY CMS-2020-01-R: HCPCS

## 2021-08-24 LAB
COVID ORDER STATUS COVID19: NORMAL
SARS-COV-2 RNA RESP QL NAA+PROBE: NOTDETECTED
SPECIMEN SOURCE: NORMAL

## 2021-09-29 ENCOUNTER — OFFICE VISIT (OUTPATIENT)
Dept: URGENT CARE | Facility: CLINIC | Age: 46
End: 2021-09-29
Payer: COMMERCIAL

## 2021-09-29 VITALS
OXYGEN SATURATION: 98 % | DIASTOLIC BLOOD PRESSURE: 62 MMHG | SYSTOLIC BLOOD PRESSURE: 120 MMHG | TEMPERATURE: 98.3 F | HEART RATE: 74 BPM | BODY MASS INDEX: 23.62 KG/M2 | HEIGHT: 75 IN | RESPIRATION RATE: 12 BRPM | WEIGHT: 190 LBS

## 2021-09-29 DIAGNOSIS — S61.412A LACERATION OF LEFT HAND WITHOUT FOREIGN BODY, INITIAL ENCOUNTER: ICD-10-CM

## 2021-09-29 PROCEDURE — 12001 RPR S/N/AX/GEN/TRNK 2.5CM/<: CPT | Performed by: NURSE PRACTITIONER

## 2021-09-29 RX ORDER — BENZONATATE 100 MG/1
CAPSULE ORAL
COMMUNITY
End: 2021-09-29

## 2021-09-29 ASSESSMENT — ENCOUNTER SYMPTOMS: ROS SKIN COMMENTS: LACERATION TO LEFT HAND

## 2021-09-29 NOTE — PROGRESS NOTES
Subjective     Kulwinder Luciano is a 45 y.o. male who presents with Laceration (Left hand between thumb/index finger, last night @ 1000, cut on a glass bowl while doing dishes )            Laceration   Incident onset: pt reports he cut his left hand on a glass bowl last night at home. he cut himself between his thumb and index finger. No bleeding today. The laceration is 1 cm in size. The laceration mechanism was a broken glass. He reports no foreign bodies present.       Review of Systems   Skin:        Laceration to left hand   All other systems reviewed and are negative.         Past Medical History:   Diagnosis Date   • Dermatitis 1/9/2018   • GERD (gastroesophageal reflux disease) 4/11/2010   • Personal history of mitral valve repair 4/11/2010   • Skin lesion 1/9/2018      Past Surgical History:   Procedure Laterality Date   • MITRAL VALVE REPAIR        Social History     Socioeconomic History   • Marital status:      Spouse name: Not on file   • Number of children: 2   • Years of education: Not on file   • Highest education level: Not on file   Occupational History   • Occupation:      Employer: [employer]     Comment: [employer]   Tobacco Use   • Smoking status: Never Smoker   • Smokeless tobacco: Never Used   Vaping Use   • Vaping Use: Never used   Substance and Sexual Activity   • Alcohol use: Yes     Comment: Mod   • Drug use: Never   • Sexual activity: Yes     Partners: Female   Other Topics Concern   • Not on file   Social History Narrative   • Not on file     Social Determinants of Health     Financial Resource Strain:    • Difficulty of Paying Living Expenses:    Food Insecurity:    • Worried About Running Out of Food in the Last Year:    • Ran Out of Food in the Last Year:    Transportation Needs:    • Lack of Transportation (Medical):    • Lack of Transportation (Non-Medical):    Physical Activity:    • Days of Exercise per Week:    • Minutes of Exercise per Session:   "  Stress:    • Feeling of Stress :    Social Connections:    • Frequency of Communication with Friends and Family:    • Frequency of Social Gatherings with Friends and Family:    • Attends Temple Services:    • Active Member of Clubs or Organizations:    • Attends Club or Organization Meetings:    • Marital Status:    Intimate Partner Violence:    • Fear of Current or Ex-Partner:    • Emotionally Abused:    • Physically Abused:    • Sexually Abused:          Objective     /62   Pulse 74   Temp 36.8 °C (98.3 °F) (Temporal)   Resp 12   Ht 1.905 m (6' 3\")   Wt 86.2 kg (190 lb)   SpO2 98%   BMI 23.75 kg/m²      Physical Exam  Vitals and nursing note reviewed.   Constitutional:       Appearance: Normal appearance.   HENT:      Head: Normocephalic and atraumatic.      Nose: Nose normal.      Mouth/Throat:      Mouth: Mucous membranes are moist.      Pharynx: Oropharynx is clear.   Eyes:      Extraocular Movements: Extraocular movements intact.      Pupils: Pupils are equal, round, and reactive to light.   Cardiovascular:      Rate and Rhythm: Normal rate and regular rhythm.   Pulmonary:      Effort: Pulmonary effort is normal.   Musculoskeletal:         General: Normal range of motion.        Arms:       Cervical back: Normal range of motion and neck supple.   Skin:     General: Skin is warm and dry.      Capillary Refill: Capillary refill takes less than 2 seconds.   Neurological:      General: No focal deficit present.      Mental Status: He is alert and oriented to person, place, and time. Mental status is at baseline.   Psychiatric:         Mood and Affect: Mood normal.         Thought Content: Thought content normal.         Judgment: Judgment normal.                  Procedure: Laceration Repair  -Risks including bleeding, nerve damage, infection, and poor cosmetic outcome discussed. Benefits and alternatives discussed.   -Clean technique with sterile instruments and suture used  -Local anesthesia " with 2% lidocaine  -Closed with #2  5-0 Nylon interrupted sutures with good wound approximation  -Polysporin and dressing placed  -Patient tolerated well                 Assessment & Plan        1. Laceration of left hand without foreign body, initial encounter    Sutures to be removed in 7-10 days  Signs of infection discussed and when to seek care  Do not excessively soak hand in water  Keep sutures clean and dry  Supportive care, differential diagnoses, and indications for immediate follow-up discussed with patient.    Pathogenesis of diagnosis discussed including typical length and natural progression.    Instructed to return to  or nearest emergency department if symptoms fail to improve, for any change in condition, further concerns, or new concerning symptoms.  Patient states understanding of the plan of care and discharge instructions.

## 2023-04-18 ENCOUNTER — HOSPITAL ENCOUNTER (OUTPATIENT)
Facility: MEDICAL CENTER | Age: 48
End: 2023-04-18
Attending: OTOLARYNGOLOGY | Admitting: OTOLARYNGOLOGY
Payer: COMMERCIAL

## 2024-08-12 ENCOUNTER — OFFICE VISIT (OUTPATIENT)
Dept: CARDIOLOGY | Facility: MEDICAL CENTER | Age: 49
End: 2024-08-12
Attending: INTERNAL MEDICINE
Payer: COMMERCIAL

## 2024-08-12 VITALS
WEIGHT: 192 LBS | BODY MASS INDEX: 24.64 KG/M2 | HEIGHT: 74 IN | RESPIRATION RATE: 14 BRPM | HEART RATE: 80 BPM | DIASTOLIC BLOOD PRESSURE: 82 MMHG | SYSTOLIC BLOOD PRESSURE: 112 MMHG | OXYGEN SATURATION: 96 %

## 2024-08-12 DIAGNOSIS — R42 LIGHTHEADEDNESS: ICD-10-CM

## 2024-08-12 DIAGNOSIS — Z98.890 S/P MITRAL VALVE REPAIR: ICD-10-CM

## 2024-08-12 DIAGNOSIS — R00.1 VAGAL BRADYCARDIA: ICD-10-CM

## 2024-08-12 DIAGNOSIS — Z00.00 ENCOUNTER FOR MEDICAL EXAMINATION TO ESTABLISH CARE: ICD-10-CM

## 2024-08-12 LAB — EKG IMPRESSION: NORMAL

## 2024-08-12 PROCEDURE — 99211 OFF/OP EST MAY X REQ PHY/QHP: CPT | Performed by: INTERNAL MEDICINE

## 2024-08-12 PROCEDURE — 3079F DIAST BP 80-89 MM HG: CPT | Performed by: INTERNAL MEDICINE

## 2024-08-12 PROCEDURE — 3074F SYST BP LT 130 MM HG: CPT | Performed by: INTERNAL MEDICINE

## 2024-08-12 PROCEDURE — 99204 OFFICE O/P NEW MOD 45 MIN: CPT | Performed by: INTERNAL MEDICINE

## 2024-08-12 PROCEDURE — 93010 ELECTROCARDIOGRAM REPORT: CPT | Performed by: INTERNAL MEDICINE

## 2024-08-12 PROCEDURE — 93005 ELECTROCARDIOGRAM TRACING: CPT | Performed by: INTERNAL MEDICINE

## 2024-08-12 RX ORDER — SILDENAFIL CITRATE 20 MG/1
20 TABLET ORAL
COMMUNITY

## 2024-08-12 RX ORDER — FINASTERIDE 1 MG/1
1 TABLET, FILM COATED ORAL EVERY MORNING
COMMUNITY

## 2024-08-12 ASSESSMENT — ENCOUNTER SYMPTOMS
CHILLS: 0
DIZZINESS: 0
DEPRESSION: 0
LOSS OF CONSCIOUSNESS: 0
PALPITATIONS: 0
SHORTNESS OF BREATH: 0
HALLUCINATIONS: 0
SENSORY CHANGE: 0
WEIGHT LOSS: 0
FALLS: 0
ABDOMINAL PAIN: 0
VOMITING: 0
MYALGIAS: 0
BRUISES/BLEEDS EASILY: 0
PND: 0
SPEECH CHANGE: 0
DOUBLE VISION: 0
COUGH: 0
BLOOD IN STOOL: 0
ORTHOPNEA: 0
CLAUDICATION: 0
BLURRED VISION: 0
EYE DISCHARGE: 0
HEADACHES: 0
NAUSEA: 0
FEVER: 0
EYE PAIN: 0

## 2024-08-12 NOTE — PROGRESS NOTES
Chief Complaint   Patient presents with    Establish Care       Subjective     Kulwinder Luciano is a 48 y.o. male who presents today for cardiac care and management due to recent hospitalization at [facility].  Patient does have prior history of mitral valve repair in 1994.  His recent transthoracic echocardiogram done in August 2024 showed normal LV function, no VSD, well-seated mitral ring repair without evidence of significant regurgitation.  No aortic valve problems.  I personally reviewed the written report.    He was golfing last week and experienced presyncopal episdoes. Symptom occurs upon during golf, on a very hot day. Patient has not sustained any injuries thus far. There is association with palpitation. There is feeling of warm sensation covering entire body when this happens. He passed out while walking back to his golf cart.    I have personally interpreted EKG today with patient, there is no evidence of acute coronary syndrome, no evidence of prior infarct, normal IN and QT interval, no significant conduction disease. Sinus rhythm.    Past Medical History:   Diagnosis Date    Dermatitis 1/9/2018    GERD (gastroesophageal reflux disease) 4/11/2010    Personal history of mitral valve repair 4/11/2010    Skin lesion 1/9/2018     Past Surgical History:   Procedure Laterality Date    MITRAL VALVE REPAIR       Family History   Problem Relation Age of Onset    Heart Disease Father         Mitral Regurg (surgery)    Heart Disease Paternal Grandmother         Migtral Regurg (surgery)    Cancer Maternal Grandmother         Breast Ca    Cancer Other         MGA Breast Ca     Social History     Socioeconomic History    Marital status:      Spouse name: Not on file    Number of children: 2    Years of education: Not on file    Highest education level: Not on file   Occupational History    Occupation:      Employer: [employer]     Comment: [employer]    Tobacco Use    Smoking status: Never    Smokeless tobacco: Never   Vaping Use    Vaping status: Never Used   Substance and Sexual Activity    Alcohol use: Yes     Comment: Mod    Drug use: Never    Sexual activity: Yes     Partners: Female   Other Topics Concern    Not on file   Social History Narrative    Not on file     Social Determinants of Health     Financial Resource Strain: Not on file   Food Insecurity: Not on file   Transportation Needs: Not on file   Physical Activity: Not on file   Stress: Not on file   Social Connections: Not on file   Intimate Partner Violence: Not on file   Housing Stability: Not on file     No Known Allergies  Outpatient Encounter Medications as of 8/12/2024   Medication Sig Dispense Refill    sildenafil (REVATIO) 20 MG tablet Take 20 mg by mouth 3 times a day.      finasteride (PROPECIA) 1 MG tablet Take 1 mg by mouth every day.      MINOXIDIL, TOPICAL, EX Apply  topically.      omeprazole (PRILOSEC) 20 MG delayed-release capsule Take 20 mg by mouth every day.      multivitamin (THERAGRAN) Tab Take 1 Tab by mouth every day.       No facility-administered encounter medications on file as of 8/12/2024.     Review of Systems   Constitutional:  Negative for chills, fever, malaise/fatigue and weight loss.   HENT:  Negative for ear discharge, ear pain, hearing loss and nosebleeds.    Eyes:  Negative for blurred vision, double vision, pain and discharge.   Respiratory:  Negative for cough and shortness of breath.    Cardiovascular:  Negative for chest pain, palpitations, orthopnea, claudication, leg swelling and PND.   Gastrointestinal:  Negative for abdominal pain, blood in stool, melena, nausea and vomiting.   Genitourinary:  Negative for dysuria and hematuria.   Musculoskeletal:  Negative for falls, joint pain and myalgias.   Skin:  Negative for itching and rash.   Neurological:  Negative for dizziness, sensory change, speech change, loss of consciousness and headaches.  "  Endo/Heme/Allergies:  Negative for environmental allergies. Does not bruise/bleed easily.   Psychiatric/Behavioral:  Negative for depression, hallucinations and suicidal ideas.               Objective     /82 (BP Location: Left arm, Patient Position: Sitting, BP Cuff Size: Adult)   Pulse 80   Resp 14   Ht 1.88 m (6' 2\")   Wt 87.1 kg (192 lb)   SpO2 96%   BMI 24.65 kg/m²     Physical Exam  Vitals and nursing note reviewed.   Constitutional:       General: He is not in acute distress.     Appearance: He is not diaphoretic.   HENT:      Head: Normocephalic and atraumatic.      Right Ear: External ear normal.      Left Ear: External ear normal.      Nose: No congestion or rhinorrhea.   Eyes:      General:         Right eye: No discharge.         Left eye: No discharge.   Neck:      Thyroid: No thyromegaly.      Vascular: No JVD.   Cardiovascular:      Rate and Rhythm: Normal rate and regular rhythm.      Pulses: Normal pulses.   Pulmonary:      Effort: No respiratory distress.   Abdominal:      General: There is no distension.      Tenderness: There is no abdominal tenderness.   Musculoskeletal:         General: No swelling or tenderness.      Right lower leg: No edema.      Left lower leg: No edema.   Skin:     General: Skin is warm and dry.   Neurological:      Mental Status: He is alert and oriented to person, place, and time.      Cranial Nerves: No cranial nerve deficit.   Psychiatric:         Behavior: Behavior normal.                Assessment & Plan     1. Vagal bradycardia  Cardiac Event Monitor      2. S/P mitral valve repair        3. Encounter for medical examination to establish care  EKG      4. Lightheadedness  EC-ECHOCARDIOGRAM REST/STRESS W/O CONT(DOES NOT INCLUDE A FULL RESTING ECHO)    Cardiac Event Monitor          Medical Decision Making: Today's Assessment/Status/Plan:   At this time patient is clinically stable in terms of her cardiac standpoint.  Symptomatology is likely related to " vaso-vagal episodes.   Advise increase hydration and salt intake with sports drinks for the time being.    At this time, to further risk stratify, I will also order an echocardiogram stress test (to avoid radiation exposure in young patients) to assess for coronary ischemia.    I will also obtain home long-term event monitoring with zio patch.    This visit encounter signifies the visit complexity inherent to evaluation and management associated with medical care services that serve as the continuing focal point for all needed health care services and/or with medical care services that are part of ongoing care related to this patient's single, serious condition, complex cardiac condition.    Lloyd Lemon M.D.

## 2024-08-16 ENCOUNTER — HOSPITAL ENCOUNTER (EMERGENCY)
Facility: MEDICAL CENTER | Age: 49
End: 2024-08-16
Attending: EMERGENCY MEDICINE
Payer: COMMERCIAL

## 2024-08-16 ENCOUNTER — APPOINTMENT (OUTPATIENT)
Dept: RADIOLOGY | Facility: MEDICAL CENTER | Age: 49
End: 2024-08-16
Payer: COMMERCIAL

## 2024-08-16 ENCOUNTER — APPOINTMENT (OUTPATIENT)
Dept: RADIOLOGY | Facility: MEDICAL CENTER | Age: 49
End: 2024-08-16
Attending: EMERGENCY MEDICINE
Payer: COMMERCIAL

## 2024-08-16 VITALS
BODY MASS INDEX: 24.33 KG/M2 | DIASTOLIC BLOOD PRESSURE: 78 MMHG | RESPIRATION RATE: 19 BRPM | HEIGHT: 74 IN | SYSTOLIC BLOOD PRESSURE: 124 MMHG | TEMPERATURE: 97.4 F | OXYGEN SATURATION: 98 % | HEART RATE: 65 BPM | WEIGHT: 189.6 LBS

## 2024-08-16 DIAGNOSIS — M79.671 RIGHT FOOT PAIN: ICD-10-CM

## 2024-08-16 DIAGNOSIS — R00.2 PALPITATIONS: ICD-10-CM

## 2024-08-16 LAB
ALBUMIN SERPL BCP-MCNC: 4.5 G/DL (ref 3.2–4.9)
ALBUMIN/GLOB SERPL: 1.7 G/DL
ALP SERPL-CCNC: 77 U/L (ref 30–99)
ALT SERPL-CCNC: 22 U/L (ref 2–50)
ANION GAP SERPL CALC-SCNC: 11 MMOL/L (ref 7–16)
AST SERPL-CCNC: 18 U/L (ref 12–45)
BASOPHILS # BLD AUTO: 0.2 % (ref 0–1.8)
BASOPHILS # BLD: 0.01 K/UL (ref 0–0.12)
BILIRUB SERPL-MCNC: 1 MG/DL (ref 0.1–1.5)
BUN SERPL-MCNC: 16 MG/DL (ref 8–22)
CALCIUM ALBUM COR SERPL-MCNC: 8.9 MG/DL (ref 8.5–10.5)
CALCIUM SERPL-MCNC: 9.3 MG/DL (ref 8.4–10.2)
CHLORIDE SERPL-SCNC: 102 MMOL/L (ref 96–112)
CO2 SERPL-SCNC: 27 MMOL/L (ref 20–33)
CREAT SERPL-MCNC: 0.94 MG/DL (ref 0.5–1.4)
EKG IMPRESSION: NORMAL
EOSINOPHIL # BLD AUTO: 0.03 K/UL (ref 0–0.51)
EOSINOPHIL NFR BLD: 0.6 % (ref 0–6.9)
ERYTHROCYTE [DISTWIDTH] IN BLOOD BY AUTOMATED COUNT: 42.5 FL (ref 35.9–50)
GFR SERPLBLD CREATININE-BSD FMLA CKD-EPI: 99 ML/MIN/1.73 M 2
GLOBULIN SER CALC-MCNC: 2.7 G/DL (ref 1.9–3.5)
GLUCOSE SERPL-MCNC: 87 MG/DL (ref 65–99)
HCT VFR BLD AUTO: 44.7 % (ref 42–52)
HGB BLD-MCNC: 15 G/DL (ref 14–18)
IMM GRANULOCYTES # BLD AUTO: 0.01 K/UL (ref 0–0.11)
IMM GRANULOCYTES NFR BLD AUTO: 0.2 % (ref 0–0.9)
LYMPHOCYTES # BLD AUTO: 1.32 K/UL (ref 1–4.8)
LYMPHOCYTES NFR BLD: 27.6 % (ref 22–41)
MCH RBC QN AUTO: 31.2 PG (ref 27–33)
MCHC RBC AUTO-ENTMCNC: 33.6 G/DL (ref 32.3–36.5)
MCV RBC AUTO: 92.9 FL (ref 81.4–97.8)
MONOCYTES # BLD AUTO: 0.4 K/UL (ref 0–0.85)
MONOCYTES NFR BLD AUTO: 8.4 % (ref 0–13.4)
NEUTROPHILS # BLD AUTO: 3.02 K/UL (ref 1.82–7.42)
NEUTROPHILS NFR BLD: 63 % (ref 44–72)
NRBC # BLD AUTO: 0 K/UL
NRBC BLD-RTO: 0 /100 WBC (ref 0–0.2)
PLATELET # BLD AUTO: 305 K/UL (ref 164–446)
PMV BLD AUTO: 9.2 FL (ref 9–12.9)
POTASSIUM SERPL-SCNC: 4.8 MMOL/L (ref 3.6–5.5)
PROT SERPL-MCNC: 7.2 G/DL (ref 6–8.2)
RBC # BLD AUTO: 4.81 M/UL (ref 4.7–6.1)
SODIUM SERPL-SCNC: 140 MMOL/L (ref 135–145)
WBC # BLD AUTO: 4.8 K/UL (ref 4.8–10.8)

## 2024-08-16 PROCEDURE — 93005 ELECTROCARDIOGRAM TRACING: CPT

## 2024-08-16 PROCEDURE — 80053 COMPREHEN METABOLIC PANEL: CPT

## 2024-08-16 PROCEDURE — 99284 EMERGENCY DEPT VISIT MOD MDM: CPT

## 2024-08-16 PROCEDURE — 73630 X-RAY EXAM OF FOOT: CPT | Mod: RT

## 2024-08-16 PROCEDURE — 71045 X-RAY EXAM CHEST 1 VIEW: CPT

## 2024-08-16 PROCEDURE — 93005 ELECTROCARDIOGRAM TRACING: CPT | Performed by: EMERGENCY MEDICINE

## 2024-08-16 PROCEDURE — 36415 COLL VENOUS BLD VENIPUNCTURE: CPT

## 2024-08-16 PROCEDURE — 85025 COMPLETE CBC W/AUTO DIFF WBC: CPT

## 2024-08-16 RX ORDER — CLOMIPHENE CITRATE 50 MG/1
50 TABLET ORAL
COMMUNITY

## 2024-08-16 RX ORDER — LEVOTHYROXINE SODIUM 25 UG/1
25 TABLET ORAL EVERY MORNING
COMMUNITY

## 2024-08-16 RX ORDER — ANASTROZOLE 1 MG/1
1 TABLET ORAL
COMMUNITY

## 2024-08-16 RX ORDER — TESTOSTERONE CYPIONATE 200 MG/ML
200 VIAL (ML) INTRAMUSCULAR
COMMUNITY

## 2024-08-16 NOTE — ED NOTES
Pharmacy Medication Reconciliation      ~Medication reconciliation updated and complete per patient at bedside   ~Allergies have been verified   ~No oral ABX within the last 30 days  ~Patient home pharmacy :  [facility]       ~Anticoagulants (rivaroxaban, apixaban, edoxaban, dabigatran, warfarin, enoxaparin) taken in the last 14 days? No

## 2024-08-16 NOTE — ED PROVIDER NOTES
"ED Provider Note    CHIEF COMPLAINT  Chief Complaint   Patient presents with    Palpitations     Palpitations, fatigue, SOB. Denies CP. Reports he was recently released from [facility] after admission for syncope. Reports he was intubated during admission. Unsure of findings at the time. Released 8/6 from [facility].    Leg Pain     R foot \"lump\" which was painful.        HPI/ROS    OUTSIDE HISTORIAN(S):  Patient's wife at bedside and a history review of systems and the patient was recently hospitalized at [facility] for syncopal episode.    Kulwinder Luciano is a 48 y.o. male who presents with complaint of palpitations.  The patient had a syncopal episode at [facility] approximately a week and a half ago was seen at [facility].  The patient was hypoxic, had respiratory distress and is intubated.  They had full workup at that facility that turned out to be negative.  He followed with cardiology and has a Zio patch coming in the mail prior today or tomorrow.  He is complaining of slight palpitations that are free infrequent.  The patient denies fever, shakes, chills, sweats, nausea, vomiting abdominal pain, chest pain, lightness, dizziness.  He does feel somewhat fatigued.  In addition, he noticed that one of the veins on his foot slightly was popping out 2 days ago and slightly painful.  It slowly went down now is barely visible.  Is concerned might have a blood clot.      PAST MEDICAL HISTORY   has a past medical history of Dermatitis (01/09/2018), GERD (gastroesophageal reflux disease) (04/11/2010), Hashimoto's disease, Personal history of mitral valve repair (04/11/2010), and Skin lesion (01/09/2018).    SURGICAL HISTORY   has a past surgical history that includes mitral valve repair.    FAMILY HISTORY  Family History   Problem Relation Age of Onset    Heart Disease Father         Mitral Regurg (surgery)    Heart Disease Paternal Grandmother         Migtral Regurg " "(surgery)    Cancer Maternal Grandmother         Breast Ca    Cancer Other         MGA Breast Ca       SOCIAL HISTORY  Social History     Tobacco Use    Smoking status: Never    Smokeless tobacco: Never   Vaping Use    Vaping status: Never Used   Substance and Sexual Activity    Alcohol use: Yes     Comment: Mod    Drug use: Never    Sexual activity: Yes     Partners: Female       CURRENT MEDICATIONS  Home Medications       Reviewed by Sarah Vance (Pharmacy Tech) on 08/16/24 at 1616  Med List Status: Complete     Medication Last Dose Status   anastrozole (ARIMIDEX) 1 MG Tab 8/13/2024 Active   clomiPHENE (CLOMID) 50 MG tablet 8/13/2024 Active   finasteride (PROPECIA) 1 MG tablet 8/16/2024 Active   levothyroxine (SYNTHROID) 25 MCG Tab 8/16/2024 Active   MINOXIDIL, TOPICAL, EX 8/13/2024 Active   multivitamin (THERAGRAN) Tab 8/16/2024 Active   omeprazole (PRILOSEC) 20 MG delayed-release capsule 8/16/2024 Active   sildenafil (REVATIO) 20 MG tablet 8/11/2024 Active   Testosterone Cypionate 200 MG/ML Solution 8/13/2024 Active                    ALLERGIES  No Known Allergies    PHYSICAL EXAM  VITAL SIGNS: /78   Pulse 65   Temp 36.3 °C (97.4 °F) (Temporal)   Resp 19   Ht 1.88 m (6' 2\")   Wt 86 kg (189 lb 9.5 oz)   SpO2 98%   BMI 24.34 kg/m²      Nursing notes and vitals reviewed.  Constitutional: Well developed, Well nourished, No acute distress, Non-toxic appearance.   Eyes: PERRLA, EOMI, Conjunctiva normal, No discharge.   Cardiovascular: Normal heart rate, Normal rhythm, No murmurs, No rubs, No gallops.   Thorax & Lungs: No respiratory distress, No rales, No rhonchi, No wheezing, No chest tenderness.   Abdomen: Bowel sounds normal, Soft, No tenderness, No guarding, No rebound, No masses, No pulsatile masses.   Skin: Warm, Dry, No erythema, No rash.   Extremities: No deformity, no edema, slight increased edema to the vein on the right dorsum foot with slight tenderness   Neurologic: Alert & oriented x " 3, no focal abnormalities noted, acting appropriately on examination  Psychiatric: Affect normal for clinical presentation.      EKG/LABS  Normal sinus rhythm on monitor  I have independently interpreted this EKG  Results for orders placed or performed during the hospital encounter of 08/16/24   CBC with Differential   Result Value Ref Range    WBC 4.8 4.8 - 10.8 K/uL    RBC 4.81 4.70 - 6.10 M/uL    Hemoglobin 15.0 14.0 - 18.0 g/dL    Hematocrit 44.7 42.0 - 52.0 %    MCV 92.9 81.4 - 97.8 fL    MCH 31.2 27.0 - 33.0 pg    MCHC 33.6 32.3 - 36.5 g/dL    RDW 42.5 35.9 - 50.0 fL    Platelet Count 305 164 - 446 K/uL    MPV 9.2 9.0 - 12.9 fL    Neutrophils-Polys 63.00 44.00 - 72.00 %    Lymphocytes 27.60 22.00 - 41.00 %    Monocytes 8.40 0.00 - 13.40 %    Eosinophils 0.60 0.00 - 6.90 %    Basophils 0.20 0.00 - 1.80 %    Immature Granulocytes 0.20 0.00 - 0.90 %    Nucleated RBC 0.00 0.00 - 0.20 /100 WBC    Neutrophils (Absolute) 3.02 1.82 - 7.42 K/uL    Lymphs (Absolute) 1.32 1.00 - 4.80 K/uL    Monos (Absolute) 0.40 0.00 - 0.85 K/uL    Eos (Absolute) 0.03 0.00 - 0.51 K/uL    Baso (Absolute) 0.01 0.00 - 0.12 K/uL    Immature Granulocytes (abs) 0.01 0.00 - 0.11 K/uL    NRBC (Absolute) 0.00 K/uL   Comp Metabolic Panel   Result Value Ref Range    Sodium 140 135 - 145 mmol/L    Potassium 4.8 3.6 - 5.5 mmol/L    Chloride 102 96 - 112 mmol/L    Co2 27 20 - 33 mmol/L    Anion Gap 11.0 7.0 - 16.0    Glucose 87 65 - 99 mg/dL    Bun 16 8 - 22 mg/dL    Creatinine 0.94 0.50 - 1.40 mg/dL    Calcium 9.3 8.4 - 10.2 mg/dL    Correct Calcium 8.9 8.5 - 10.5 mg/dL    AST(SGOT) 18 12 - 45 U/L    ALT(SGPT) 22 2 - 50 U/L    Alkaline Phosphatase 77 30 - 99 U/L    Total Bilirubin 1.0 0.1 - 1.5 mg/dL    Albumin 4.5 3.2 - 4.9 g/dL    Total Protein 7.2 6.0 - 8.2 g/dL    Globulin 2.7 1.9 - 3.5 g/dL    A-G Ratio 1.7 g/dL   ESTIMATED GFR   Result Value Ref Range    GFR (CKD-EPI) 99 >60 mL/min/1.73 m 2   EKG   Result Value Ref Range    Report       [facility] Emergency Dept.    Test Date:  2024  Pt Name:    KAY GARCIA             Department: EDSM  MRN:        [id number]                      Room:  Gender:     Male                         Technician: [id number]  :        1975                   Requested By:ER TRIAGE PROTOCOL  Order #:    [id number]                    Reading MD: MARK SHERWOOD DO    Measurements  Intervals                                Axis  Rate:       75                           P:          6  DE:         248                          QRS:        37  QRSD:       107                          T:          36  QT:         376  QTc:        420    Interpretive Statements  Sinus rhythm  Prolonged DE interval  RSR' in V1 or V2, probably normal variant  Nonspecific T abnormalities, lateral leads  Compared to ECG 2024 15:37:27  RSR' in V1 or V2 now present  T-wave abnormality now present  Electronically Signed On 2024 18:58:34 PDT by MARK SHERWOOD DO         RADIOLOGY/PROCEDURES   I have independently interpreted the diagnostic imaging associated with this visit and am waiting the final reading from the radiologist.   My preliminary interpretation is as follows: X-ray of the foot is negative for fracture or abnormality.    Radiologist interpretation:  DX-FOOT-COMPLETE 3+ RIGHT   Final Result      No evidence of acute fracture or dislocation.      DX-CHEST-PORTABLE (1 VIEW)   Final Result      No evidence of acute cardiopulmonary process.          COURSE & MEDICAL DECISION MAKING    ASSESSMENT, COURSE AND PLAN  Care Narrative: This is a pleasant 48-year-old presents with palpitations.  Here in the emergency department, he has notes of ectopy.  He is looks slightly prolonged DE interval but I do not believe is quiring him to stay in the hospital.  He has a Zio patch ordered for today or tomorrow to come in the mail as he recently saw cardiology.  I do not believe he is experiencing any significant  cardiac abnormality, he has no pain suspicious for acute coronary syndrome or other symptoms he has a heart score of 1 for age.  In addition, patient's foot x-ray was negative for abnormality such as fracture, lesion.  The patient has very superficial vein irritation that relatively is normal here.  I do not believe he has a DVT as he has no symptoms of DVT currently.  The patient was reevaluated after  observation and patient had no ectopy here.  Do believe the patient will require follow-up as an outpatient with cardiology and strict return precautions have been given.      ADDITIONAL PROBLEMS MANAGED  Syncopal episode unknown etiology.  This is being follow-up with cardiology.    DISPOSITION AND DISCUSSIONS      Decision tools and prescription drugs considered including, but not limited to: Concern for DVT, clinically the patient has no clinical findings suspicious for DVT therefore imaging was not completed.    FINAL DIAGNOSIS  1. Right foot pain    2. Palpitations        DISPOSITION:  Patient will be discharged home in stable condition.    FOLLOW UP:  [facility]  [address]  [address]  [place] [id number] [phone]    Schedule an appointment as soon as possible for a visit   As needed    [facility], Emergency Dept  [address]  [place] [id number] [phone]    If symptoms worsen        Electronically signed by: Maciel Douglass D.O., 8/16/2024 4:12 PM

## 2024-08-16 NOTE — ED TRIAGE NOTES
"Chief Complaint   Patient presents with    Palpitations     Palpitations, fatigue, SOB. Denies CP. Reports he was recently released from [facility] after admission for syncope. Reports he was intubated during admission. Unsure of findings at the time. Released 8/6 from [facility].    Leg Pain     R foot \"lump\" which was painful.      Physical Exam  Pulmonary:      Effort: Pulmonary effort is normal.   Skin:     General: Skin is warm and dry.   Neurological:      Mental Status: He is alert.         "

## 2024-08-16 NOTE — ED NOTES
Patient is stable for d/c at this time, anticipatory guidance provided, close follow-up encouraged, and ED return instructions have been detailed. Patient and family are both agreeable to the disposition, and plan and discharged home in ambulatory state and good condition.

## 2024-08-20 ENCOUNTER — NON-PROVIDER VISIT (OUTPATIENT)
Dept: CARDIOLOGY | Facility: MEDICAL CENTER | Age: 49
End: 2024-08-20
Attending: INTERNAL MEDICINE
Payer: COMMERCIAL

## 2024-08-20 NOTE — PROGRESS NOTES
Home enrollment completed in the 14 day Zio Holter monitor per Lloyd Lemon MD.  Monitor will be shipped to patient via [employer], pending EOS.

## 2024-09-11 ENCOUNTER — TELEPHONE (OUTPATIENT)
Dept: CARDIOLOGY | Facility: MEDICAL CENTER | Age: 49
End: 2024-09-11
Payer: COMMERCIAL

## 2024-09-11 DIAGNOSIS — R00.1 VAGAL BRADYCARDIA: ICD-10-CM

## 2024-09-11 DIAGNOSIS — R42 LIGHTHEADEDNESS: ICD-10-CM

## 2024-09-11 NOTE — TELEPHONE ENCOUNTER
Antonina EOS to TT's nurse, Poncho, on 9/11/2024    Preliminary findings:    1 episode of SVT  with an avg rate of 99 bpm    Sinus rhythm  with an avg rate of 79 bpm, First Degree AV Block present    Rare supraventricular ectopy    Rare ventricular ectopy with no present triplets, bigeminy and trigeminy were present    19 patient events:  SR   VE(s)

## 2024-09-12 ENCOUNTER — APPOINTMENT (OUTPATIENT)
Dept: CARDIOLOGY | Facility: MEDICAL CENTER | Age: 49
End: 2024-09-12
Attending: INTERNAL MEDICINE
Payer: COMMERCIAL

## 2024-09-18 ENCOUNTER — TELEPHONE (OUTPATIENT)
Dept: CARDIOLOGY | Facility: MEDICAL CENTER | Age: 49
End: 2024-09-18
Payer: COMMERCIAL

## 2024-10-29 ENCOUNTER — OFFICE VISIT (OUTPATIENT)
Dept: NEUROLOGY | Facility: MEDICAL CENTER | Age: 49
End: 2024-10-29
Attending: PSYCHIATRY & NEUROLOGY
Payer: COMMERCIAL

## 2024-10-29 VITALS
BODY MASS INDEX: 25.27 KG/M2 | HEART RATE: 80 BPM | WEIGHT: 196.87 LBS | HEIGHT: 74 IN | TEMPERATURE: 99.1 F | DIASTOLIC BLOOD PRESSURE: 68 MMHG | SYSTOLIC BLOOD PRESSURE: 110 MMHG | RESPIRATION RATE: 19 BRPM | OXYGEN SATURATION: 96 %

## 2024-10-29 DIAGNOSIS — T67.1XXS HEAT SYNCOPE, SEQUELA: ICD-10-CM

## 2024-10-29 PROCEDURE — 99212 OFFICE O/P EST SF 10 MIN: CPT | Performed by: PSYCHIATRY & NEUROLOGY

## 2024-10-29 ASSESSMENT — FIBROSIS 4 INDEX: FIB4 SCORE: 0.62

## 2024-10-29 ASSESSMENT — PATIENT HEALTH QUESTIONNAIRE - PHQ9: CLINICAL INTERPRETATION OF PHQ2 SCORE: 0

## 2024-10-31 ENCOUNTER — HOSPITAL ENCOUNTER (OUTPATIENT)
Dept: CARDIOLOGY | Facility: MEDICAL CENTER | Age: 49
End: 2024-10-31
Attending: INTERNAL MEDICINE
Payer: COMMERCIAL

## 2024-10-31 DIAGNOSIS — R42 LIGHTHEADEDNESS: ICD-10-CM

## 2024-10-31 LAB — LV EJECT FRACT  99904: 65

## 2024-10-31 PROCEDURE — 93017 CV STRESS TEST TRACING ONLY: CPT

## 2024-10-31 PROCEDURE — 93351 STRESS TTE COMPLETE: CPT | Mod: 26 | Performed by: INTERNAL MEDICINE

## 2024-11-18 ENCOUNTER — OFFICE VISIT (OUTPATIENT)
Dept: URGENT CARE | Facility: CLINIC | Age: 49
End: 2024-11-18
Payer: COMMERCIAL

## 2024-11-18 VITALS
HEIGHT: 74 IN | RESPIRATION RATE: 20 BRPM | HEART RATE: 69 BPM | WEIGHT: 196 LBS | BODY MASS INDEX: 25.15 KG/M2 | SYSTOLIC BLOOD PRESSURE: 122 MMHG | TEMPERATURE: 97 F | DIASTOLIC BLOOD PRESSURE: 78 MMHG | OXYGEN SATURATION: 99 %

## 2024-11-18 DIAGNOSIS — L01.00 IMPETIGO: ICD-10-CM

## 2024-11-18 PROCEDURE — 3074F SYST BP LT 130 MM HG: CPT | Performed by: PHYSICIAN ASSISTANT

## 2024-11-18 PROCEDURE — 3078F DIAST BP <80 MM HG: CPT | Performed by: PHYSICIAN ASSISTANT

## 2024-11-18 PROCEDURE — 99203 OFFICE O/P NEW LOW 30 MIN: CPT | Performed by: PHYSICIAN ASSISTANT

## 2024-11-18 RX ORDER — CEPHALEXIN 500 MG/1
500 CAPSULE ORAL 4 TIMES DAILY
Qty: 28 CAPSULE | Refills: 0 | Status: SHIPPED | OUTPATIENT
Start: 2024-11-18 | End: 2024-11-25

## 2024-11-18 RX ORDER — MUPIROCIN 20 MG/G
1 OINTMENT TOPICAL 2 TIMES DAILY
Qty: 22 G | Refills: 0 | Status: SHIPPED | OUTPATIENT
Start: 2024-11-18 | End: 2024-11-25

## 2024-11-18 ASSESSMENT — FIBROSIS 4 INDEX: FIB4 SCORE: 0.63

## 2024-11-18 NOTE — PROGRESS NOTES
Subjective     Kulwinder Luciano is a 49 y.o. male who presents with Sore (Left side of nose 7 days )    HPI:  Kulwinder Luciano is a 49 y.o. male who presents today for evaluation of a sore on the left side of his nose.  He started to notice about 1 week ago.  He has been applying antibiotic ointment to it but it is not improving.  He says he does have a history of impetigo and normally responds well to the prescription antibiotic ointment.  He has not had any fever.  Denies any injury.        ROS        PMH:  has a past medical history of Dermatitis (01/09/2018), GERD (gastroesophageal reflux disease) (04/11/2010), Hashimoto's disease, Personal history of mitral valve repair (04/11/2010), and Skin lesion (01/09/2018).  MEDS:   Current Outpatient Medications:     cephALEXin (KEFLEX) 500 MG Cap, Take 1 Capsule by mouth 4 times a day for 7 days., Disp: 28 Capsule, Rfl: 0    mupirocin (BACTROBAN) 2 % Ointment, Apply 1 Application topically 2 times a day for 7 days., Disp: 22 g, Rfl: 0    clomiPHENE (CLOMID) 50 MG tablet, Take 50 mg by mouth two times a week. Tuesday & Friday, Disp: , Rfl:     anastrozole (ARIMIDEX) 1 MG Tab, Take 1 mg by mouth two times a week. Tuesday & Friday, Disp: , Rfl:     Testosterone Cypionate 200 MG/ML Solution, Inject 200 mg as directed every 7 days., Disp: , Rfl:     levothyroxine (SYNTHROID) 25 MCG Tab, Take 50 mcg by mouth every morning., Disp: , Rfl:     sildenafil (REVATIO) 20 MG tablet, Take 20 mg by mouth 1 time a day as needed (ED)., Disp: , Rfl:     finasteride (PROPECIA) 1 MG tablet, Take 1 mg by mouth every morning., Disp: , Rfl:     MINOXIDIL, TOPICAL, EX, Apply 1 Application topically 2 times a day., Disp: , Rfl:     omeprazole (PRILOSEC) 20 MG delayed-release capsule, Take 20 mg by mouth every day., Disp: , Rfl:     multivitamin (THERAGRAN) Tab, Take 1 Tab by mouth every day., Disp: , Rfl:   ALLERGIES: No Known Allergies  SURGHX:   Past Surgical History:   Procedure  "Laterality Date    MITRAL VALVE REPAIR       SOCHX:  reports that he has never smoked. He has never used smokeless tobacco. He reports current alcohol use of about 1.2 oz of alcohol per week. He reports that he does not use drugs.  FH: Family history was reviewed, no pertinent findings to report        Objective     /78   Pulse 69   Temp 36.1 °C (97 °F) (Temporal)   Resp 20   Ht 1.88 m (6' 2\")   Wt 88.9 kg (196 lb)   SpO2 99%   BMI 25.16 kg/m²      Physical Exam  Constitutional:       General: He is not in acute distress.     Appearance: He is not diaphoretic.   HENT:      Head: Normocephalic and atraumatic.      Right Ear: External ear normal.      Left Ear: External ear normal.      Nose:        Comments: External/lateral aspect of left nasal opening exhibits a wound with surrounding erythema and overlying honey colored crust.  There is some mild surrounding soft tissue swelling.  Eyes:      Conjunctiva/sclera: Conjunctivae normal.      Pupils: Pupils are equal, round, and reactive to light.   Pulmonary:      Effort: Pulmonary effort is normal. No respiratory distress.   Musculoskeletal:      Cervical back: Normal range of motion.   Skin:     Findings: No rash.   Neurological:      Mental Status: He is alert and oriented to person, place, and time.   Psychiatric:         Mood and Affect: Mood and affect normal.         Cognition and Memory: Memory normal.         Judgment: Judgment normal.             Assessment & Plan     1. Impetigo  - cephALEXin (KEFLEX) 500 MG Cap; Take 1 Capsule by mouth 4 times a day for 7 days.  Dispense: 28 Capsule; Refill: 0  - mupirocin (BACTROBAN) 2 % Ointment; Apply 1 Application topically 2 times a day for 7 days.  Dispense: 22 g; Refill: 0  Symptoms and exam findings are consistent with impetigo, which patient reports having history of.  He says that normally responds well to the antibiotic ointment.  Mupirocin sent to the pharmacy.  He was also given a contingent " prescription for oral antibiotics if he does not start to notice significant improvement after using the ointment for 5 days.  He should be careful not to reuse any towels or washcloths that he uses to dry or wash his face.  Also recommend that he change his pillowcase daily.  Importance of good hand hygiene discussed.            Differential Diagnosis, natural history, and supportive care discussed. Return to the Urgent Care or follow up with your PCP if symptoms fail to resolve, or for any new or worsening symptoms. Emergency room precautions discussed. Patient and/or family appears understanding of information.

## 2025-08-21 ENCOUNTER — APPOINTMENT (OUTPATIENT)
Dept: RADIOLOGY | Facility: MEDICAL CENTER | Age: 50
End: 2025-08-21
Attending: NURSE PRACTITIONER
Payer: COMMERCIAL

## 2025-08-21 DIAGNOSIS — N52.8 OTHER MALE ERECTILE DYSFUNCTION: ICD-10-CM

## 2025-08-21 DIAGNOSIS — R31.9 LOIN PAIN-HEMATURIA SYNDROME: ICD-10-CM

## 2025-08-21 DIAGNOSIS — M54.50 LOIN PAIN-HEMATURIA SYNDROME: ICD-10-CM

## 2025-08-21 PROCEDURE — 72220 X-RAY EXAM SACRUM TAILBONE: CPT

## 2025-08-21 PROCEDURE — 72100 X-RAY EXAM L-S SPINE 2/3 VWS: CPT
